# Patient Record
Sex: MALE | Race: OTHER | Employment: PART TIME | URBAN - METROPOLITAN AREA
[De-identification: names, ages, dates, MRNs, and addresses within clinical notes are randomized per-mention and may not be internally consistent; named-entity substitution may affect disease eponyms.]

---

## 2020-12-24 ENCOUNTER — HOSPITAL ENCOUNTER (EMERGENCY)
Age: 25
Discharge: HOME | End: 2020-12-24
Attending: EMERGENCY MEDICINE
Payer: MEDICARE

## 2020-12-24 VITALS
HEIGHT: 73 IN | SYSTOLIC BLOOD PRESSURE: 131 MMHG | DIASTOLIC BLOOD PRESSURE: 84 MMHG | BODY MASS INDEX: 17.23 KG/M2 | RESPIRATION RATE: 18 BRPM | HEART RATE: 54 BPM | WEIGHT: 130 LBS | OXYGEN SATURATION: 100 % | TEMPERATURE: 97.7 F

## 2020-12-24 PROCEDURE — 99283 EMERGENCY DEPT VISIT LOW MDM: CPT

## 2020-12-24 RX ORDER — IBUPROFEN 800 MG/1
800 TABLET ORAL EVERY 8 HOURS PRN
Qty: 30 TABLET | Refills: 0 | Status: SHIPPED | OUTPATIENT
Start: 2020-12-24 | End: 2021-01-03

## 2020-12-24 ASSESSMENT — PAIN DESCRIPTION - ONSET
ONSET: SUDDEN
ONSET: ON-GOING

## 2020-12-24 ASSESSMENT — PAIN SCALES - GENERAL
PAINLEVEL_OUTOF10: 8
PAINLEVEL_OUTOF10: 8

## 2020-12-24 ASSESSMENT — PAIN DESCRIPTION - PAIN TYPE: TYPE: ACUTE PAIN

## 2020-12-24 ASSESSMENT — PAIN DESCRIPTION - FREQUENCY: FREQUENCY: CONTINUOUS

## 2020-12-24 ASSESSMENT — PAIN DESCRIPTION - ORIENTATION: ORIENTATION: LEFT

## 2020-12-24 ASSESSMENT — PAIN DESCRIPTION - DESCRIPTORS: DESCRIPTORS: TIGHTNESS

## 2020-12-24 ASSESSMENT — PAIN DESCRIPTION - PROGRESSION: CLINICAL_PROGRESSION: NOT CHANGED

## 2020-12-24 ASSESSMENT — PAIN DESCRIPTION - LOCATION: LOCATION: NECK

## 2020-12-24 NOTE — LETTER
[facility]  [address] [place] [address]  Phone: [phone]               December 24, 2020    Patient: Martinez Mann   YOB: 1995   Date of Visit: 12/24/2020       To Whom It May Concern:    Diann Tierney was seen and treated in our emergency department on 12/24/2020. He may return to work on 12/25/20.       Sincerely,       Juan Elizondo RN         Signature:__________________________________

## 2020-12-24 NOTE — ED TRIAGE NOTES
Pt woke this am with neck feeling tight and felt a pop. Pt denies injury. Ice applied upon arrival to ED, nothing taken for pain rated 8/10. Pt able to move neck but has pain when moving towards left. Able to move neck to right and up and down. Pt plan of care explained to Pt at bedside during exam by Dr. Zina Garcia.

## 2020-12-24 NOTE — ED PROVIDER NOTES
HPI:  12/24/20, Time: 8:55 AM CARLOS Cole is a 31 y.o. male presenting to the ED for neck pain, beginning several hours ago. The complaint has been intermittent, mild in severity, and worsened by changing position. The patient woke up and was turning his head when he developed pain in the left cervical paraspinal muscles there is no radiation no paresthesias no incontinence. There is pain and spasm left cervical paraspinal muscles that does not radiate    ROS:   Pertinent positives and negatives are stated within HPI, all other systems reviewed and are negative.  --------------------------------------------- PAST HISTORY ---------------------------------------------  Past Medical History:  has a past medical history of ADD (attention deficit disorder), Anxiety, Bipolar 1 disorder ([facility] [address] Defiant behavior, Depression, Disabled, Explosive personality disorder in [facility]), Numbness, Dorsal R wrist, Oppositional defiant disorder, and Wrist pain, right. Past Surgical History:  has a past surgical history that includes Laparoscopic Adrenalectomy (6/22/2014); Appendectomy (June 2014); and Upper gastrointestinal endoscopy (09/12/2016). Social History:  reports that he has been smoking cigarettes. He has been smoking about 0.25 packs per day. He has never used smokeless tobacco. He reports current alcohol use. He reports current drug use. Drug: Marijuana. Family History: family history is not on file. The patients home medications have been reviewed.     Allergies: Poison ivy extract [poison ivy extract]    ---------------------------------------------------PHYSICAL EXAM--------------------------------------     Constitutional/General: Alert and oriented x3, well appearing, non toxic in NAD  Head: Normocephalic and atraumatic  Eyes: PERRL, EOMI  Mouth: Oropharynx clear, handling secretions, no trismus The patient was discharged home on ibuprofen 800 mg 3 times a day with outpatient follow-up in 48 hours he was advised if he has persistent or worsening pain to return to the ER        This patient's ED course included: a personal history and physicial eaxmination    This patient has remained hemodynamically stable during their ED course. Counseling: The emergency provider has spoken with the patient and discussed todays results, in addition to providing specific details for the plan of care and counseling regarding the diagnosis and prognosis. Questions are answered at this time and they are agreeable with the plan.       --------------------------------- IMPRESSION AND DISPOSITION ---------------------------------    IMPRESSION  1. Torticollis        DISPOSITION  Disposition: Discharge to home  Patient condition is good        NOTE: This report was transcribed using voice recognition software.  Every effort was made to ensure accuracy; however, inadvertent computerized transcription errors may be present         Mairo Eddy MD  12/24/20 [id number]